# Patient Record
Sex: FEMALE | Race: WHITE
[De-identification: names, ages, dates, MRNs, and addresses within clinical notes are randomized per-mention and may not be internally consistent; named-entity substitution may affect disease eponyms.]

---

## 2020-02-11 ENCOUNTER — HOSPITAL ENCOUNTER (EMERGENCY)
Dept: HOSPITAL 25 - ED | Age: 30
Discharge: HOME | End: 2020-02-11
Payer: COMMERCIAL

## 2020-02-11 VITALS — DIASTOLIC BLOOD PRESSURE: 67 MMHG | SYSTOLIC BLOOD PRESSURE: 129 MMHG

## 2020-02-11 DIAGNOSIS — O20.9: Primary | ICD-10-CM

## 2020-02-11 DIAGNOSIS — J45.909: ICD-10-CM

## 2020-02-11 DIAGNOSIS — Z90.49: ICD-10-CM

## 2020-02-11 DIAGNOSIS — Z3A.12: ICD-10-CM

## 2020-02-11 DIAGNOSIS — Z88.0: ICD-10-CM

## 2020-02-11 DIAGNOSIS — Z88.1: ICD-10-CM

## 2020-02-11 DIAGNOSIS — Z88.2: ICD-10-CM

## 2020-02-11 LAB
ALBUMIN SERPL BCG-MCNC: 4.3 G/DL (ref 3.2–5.2)
ALBUMIN/GLOB SERPL: 1.4 {RATIO} (ref 1–3)
ALP SERPL-CCNC: 60 U/L (ref 34–104)
ALT SERPL W P-5'-P-CCNC: 33 U/L (ref 7–52)
ANION GAP SERPL CALC-SCNC: 6 MMOL/L (ref 2–11)
AST SERPL-CCNC: 24 U/L (ref 13–39)
BASOPHILS # BLD AUTO: 0 10^3/UL (ref 0–0.2)
BUN SERPL-MCNC: 10 MG/DL (ref 6–24)
BUN/CREAT SERPL: 16.4 (ref 8–20)
CALCIUM SERPL-MCNC: 9.8 MG/DL (ref 8.6–10.3)
CHLORIDE SERPL-SCNC: 104 MMOL/L (ref 101–111)
EOSINOPHIL # BLD AUTO: 0.1 10^3/UL (ref 0–0.6)
GLOBULIN SER CALC-MCNC: 3 G/DL (ref 2–4)
GLUCOSE SERPL-MCNC: 88 MG/DL (ref 70–100)
HCO3 SERPL-SCNC: 25 MMOL/L (ref 22–32)
HCT VFR BLD AUTO: 41 % (ref 35–47)
HGB BLD-MCNC: 14 G/DL (ref 12–16)
LYMPHOCYTES # BLD AUTO: 2.1 10^3/UL (ref 1–4.8)
MCH RBC QN AUTO: 30 PG (ref 27–31)
MCHC RBC AUTO-ENTMCNC: 34 G/DL (ref 31–36)
MCV RBC AUTO: 88 FL (ref 80–97)
MONOCYTES # BLD AUTO: 0.8 10^3/UL (ref 0–0.8)
NEUTROPHILS # BLD AUTO: 9.1 10^3/UL (ref 1.5–7.7)
NRBC # BLD AUTO: 0 10^3/UL
NRBC BLD QL AUTO: 0.1
PLATELET # BLD AUTO: 293 10^3/UL (ref 150–450)
POTASSIUM SERPL-SCNC: 4 MMOL/L (ref 3.5–5)
PROT SERPL-MCNC: 7.3 G/DL (ref 6.4–8.9)
RBC # BLD AUTO: 4.65 10^6 /UL (ref 3.7–4.87)
SODIUM SERPL-SCNC: 135 MMOL/L (ref 135–145)
WBC # BLD AUTO: 12.2 10^3/UL (ref 3.5–10.8)
WBC UR QL AUTO: (no result)

## 2020-02-11 PROCEDURE — 76815 OB US LIMITED FETUS(S): CPT

## 2020-02-11 PROCEDURE — 87591 N.GONORRHOEAE DNA AMP PROB: CPT

## 2020-02-11 PROCEDURE — 85025 COMPLETE CBC W/AUTO DIFF WBC: CPT

## 2020-02-11 PROCEDURE — 87661 TRICHOMONAS VAGINALIS AMPLIF: CPT

## 2020-02-11 PROCEDURE — 87510 GARDNER VAG DNA DIR PROBE: CPT

## 2020-02-11 PROCEDURE — 80053 COMPREHEN METABOLIC PANEL: CPT

## 2020-02-11 PROCEDURE — 84702 CHORIONIC GONADOTROPIN TEST: CPT

## 2020-02-11 PROCEDURE — 87491 CHLMYD TRACH DNA AMP PROBE: CPT

## 2020-02-11 PROCEDURE — 36415 COLL VENOUS BLD VENIPUNCTURE: CPT

## 2020-02-11 PROCEDURE — 99283 EMERGENCY DEPT VISIT LOW MDM: CPT

## 2020-02-11 PROCEDURE — 81015 MICROSCOPIC EXAM OF URINE: CPT

## 2020-02-11 PROCEDURE — 87086 URINE CULTURE/COLONY COUNT: CPT

## 2020-02-11 PROCEDURE — 96372 THER/PROPH/DIAG INJ SC/IM: CPT

## 2020-02-11 PROCEDURE — 87480 CANDIDA DNA DIR PROBE: CPT

## 2020-02-11 PROCEDURE — 81003 URINALYSIS AUTO W/O SCOPE: CPT

## 2020-02-11 RX ADMIN — HUMAN RHO(D) IMMUNE GLOBULIN ONE I.U.: 300 INJECTION, SOLUTION INTRAMUSCULAR at 16:45

## 2020-02-11 NOTE — ED
Pregnancy





- HPI Summary


HPI Summary: 





Patient is a 28 y/o F presenting to the ED for a chief complaint of vaginal 

bleeding on the morning of 02/11/20 that has since resolved. Patient is present 

with her sister. Patient also reports LLQ abdominal pain that she rates as a 4/

10 and is described as a sharp sensation. She denies vaginal discharge, nausea, 

vomiting, diarrhea, or constipation. No aggravating or alleviating factors are 

reported. Patient is 11 weeks pregnant and this is her first pregnancy. She 

denies any complications during her pregnancy. The pregnancy was confirmed by 

US that showed an intrauterine pregnancy. Last sexual intercourse was in the 

last week, but none in the last 3 days. PMHx is significant for chronic 

idiopathic constipation. PSHx is significant for cholecystectomy. Medications 

reviewed. Allergies noted. 





- History of Current Complaint


Chief Complaint: EDOBProblems


Stated Complaint: 11 WEEKS PREGNANT- CRAMPING AND SPOTTING PER PT


Time Seen by Provider: 02/11/20 15:59


Hx Obtained From: Patient


Chief Complaint: Vaginal Bleeding


Onset/Duration: Atraumatic, Resolved


Timing: Lasting Hours


Severity: Moderate


Current Severity: Moderate


Pain Intensity: 4


Location of Pain: Left Side


Character: Cramping, Sharp


Associated Signs and Symptoms: Positive: Vaginal Bleeding or Discharge.  

Negative: Nausea, Vomiting





- Allergies/Home Medications


Allergies/Adverse Reactions: 


 Allergies











Allergy/AdvReac Type Severity Reaction Status Date / Time


 


amoxicillin Allergy  Rash Verified 02/11/20 16:08


 


erythromycin base Allergy  GI Upset Verified 02/11/20 16:08





[From E-Mycin]     


 


Penicillins Allergy  Rash Verified 02/11/20 16:08


 


Sulfa (Sulfonamide Allergy  Hives Verified 02/11/20 16:08





Antibiotics)     














PMH/Surg Hx/FS Hx/Imm Hx


Previously Healthy: Yes


Endocrine/Hematology History: 


   Denies: Hx Diabetes, Hx Thyroid Disease


Cardiovascular History: 


   Denies: Hx Hypertension


Respiratory History: Reports: Hx Asthma - prn med


   Denies: Hx Chronic Obstructive Pulmonary Disease (COPD)


GI History: Reports: Other GI Disorders - gallbladder attacks, found stones, 

chronic idopathic constipation


   Denies: Hx Ulcer


Musculoskeletal History: Reports: Hx Tendonitis - bilateral knees


Sensory History: Reports: Hx Contacts or Glasses - glasses


   Denies: Hx Legally Blind, Hx Deafness


Opthamlomology History: Reports: Hx Contacts or Glasses - glasses


   Denies: Hx Legally Blind


EENT History: 


   Denies: Hx Deafness





- Surgical History


Surgical History: Yes


Surgery Procedure, Year, and Place: Cholecystectomy


Hx Anesthesia Reactions: No


Infectious Disease History: No


Infectious Disease History: 


   Denies: Hx Clostridium Difficile, Hx Hepatitis, Hx Human Immunodeficiency 

Virus (HIV), Hx of Known/Suspected MRSA, Hx Shingles, Hx Tuberculosis, Hx Known/

Suspected VRE, Hx Known/Suspected VRSA, History Other Infectious Disease, 

Traveled Outside the [place] in Last 30 Days





- Family History


Known Family History: Positive: Other - ovarian cysts





- Social History


Occupation: Employed Full-time


Lives: With Family


Alcohol Use: None


Hx Substance Use: No


Substance Use Type: Reports: None


Hx Tobacco Use: No


Smoking Status (MU): Never Smoked Tobacco





Review of Systems


Positive: Abdominal Pain - LLQ.  Negative: Vomiting, Diarrhea, Nausea, Other - 

Negative constipation


Positive: other - Positive vaginal bleeding.  Negative: discharge - Vaginal


All Other Systems Reviewed And Are Negative: Yes





Physical Exam





- Summary


Physical Exam Summary: 





Constitutional: Well-developed, Well-nourished, Alert. (-) Distressed


Skin: Warm, Dry


HENT: Normocephalic; Atraumatic


Eyes: Conjunctiva normal


Neck: Musculoskeletal ROM normal neck. (-) JVD, (-) Stridor, (-) Tracheal 

deviation


Cardio: Rhythm regular, rate normal, Heart sounds normal; Intact distal pulses; 

Radial pulses are 2+ and symmetric. (-) Murmur


Pulmonary/Chest wall: Effort normal. (-) Respiratory distress, (-) Wheezes, (-) 

Rales


Abd: Soft, (-) tenderness, (-) Distension, (-) Guarding, (-) Rebound


Musculoskeletal: (-) Edema


Lymph: (-) Cervical adenopathy


Neuro: Alert, Oriented x3


Psych: Mood and affect Normal


: Lesly Lambert, no relation, is present for the exam. Scant white discharge

, cervix is closed, no bleeding, no abdominal tenderness, no masses palpated. 





- Physical  Exam


Triage Information Reviewed: Yes


Vital Signs Reviewed: Yes





Procedures





- Sedation


Patient Received Moderate/Deep Sedation with Procedure: No





Diagnostics





- Vital Signs


 Vital Signs











  Temp Pulse Resp BP Pulse Ox


 


 02/11/20 14:32  98.8 F  100  18  151/96  100














- Laboratory


Result Diagrams: 


 02/11/20 15:53





 02/11/20 15:53


Lab Statement: Any lab studies that have been ordered have been reviewed, and 

results considered in the medical decision making process.





- Ultrasound


  ** Fetal US


Ultrasound Interpretation Completed By: Radiologist


Summary of Ultrasound Findings: Fetal US IMPRESSION:  1. THERE IS A SINGLE 

VIABLE INTRAUTERINE PREGNANCY WITH AN ESTIMATED GESTATIONAL AGE OF 12 WEEKS 2 

DAYS.  2. THE PLACENTA IS LOW-LYING. RECOMMEND A FOLLOW-UP PREGNANCY ULTRASOUND 

AT 18 WEEKS FOR FURTHER EVALUATION.  Reviewed by Dr. Del Rio.





Pregnancy Course/Dx





- Course


Course Of Treatment: Patient is here with vaginal bleeding in the setting of 

first trimester pregnancy.  Patient had an old shunt performed which showed a 

viable intrauterine pregnancy.  Patient had a pelvic exam showed closed cervix.

  Patient is O- per her history so she was given a dose of [place].  Patient 

could possibly have an early miscarriage so she was educated on vaginal rest.  

Patient will follow up with her OB/GYN





- Diagnoses


Provider Diagnoses: 


 Vaginal bleeding, Pregnancy








Discharge ED





- Sign-Out/Discharge


Documenting (check all that apply): Patient Departure - Discharge





- Discharge Plan


Condition: Stable


Disposition: HOME


Patient Education Materials:  Pregnancy (ED)


Referrals: 


Lexie Low MD [Primary Care Provider] - 


Additional Instructions: 


PLEASE RETURN TO EMERGENCY DEPARTMENT FOR SEVERE ABDOMINAL PAIN, YOU FEEL YOU 

ARE GOING TO PASS OUT, OR ANY NEW OR WORSENING SYMPTOMS. Please follow up with 

your primary care physician. Please make all follow-ups in 1-3 days unless I 

advise you otherwise. Do not masturbate or have intercourse until you are seen 

by your OB during your scheduled appointment. 





- Billing Disposition and Condition


Condition: STABLE


Disposition: Home





- Attestation Statements


Document Initiated by Marilynibnora: Yes


Documenting Scribe: Valorie Whitten


Provider For Whom Christian is Documenting (Include Credential): Rigoberto Del Rio MD


Scribe Attestation: 


Valorie MONZON, scribed for Rigoberto Del Rio MD on 02/11/20 at 1835. 


Scribe Documentation Reviewed: Yes


Provider Attestation: 


The documentation as recorded by the Valorie peres accurately reflects 

the service I personally performed and the decisions made by me, Rigoberto Del Rio MD


Status of Scribe Document: Viewed